# Patient Record
Sex: FEMALE | Race: WHITE | NOT HISPANIC OR LATINO | URBAN - METROPOLITAN AREA
[De-identification: names, ages, dates, MRNs, and addresses within clinical notes are randomized per-mention and may not be internally consistent; named-entity substitution may affect disease eponyms.]

---

## 2019-10-08 ENCOUNTER — INPATIENT HOSPITAL (AMBULATORY)
Dept: URBAN - METROPOLITAN AREA HOSPITAL 133 | Facility: HOSPITAL | Age: 29
End: 2019-10-08

## 2019-10-08 DIAGNOSIS — K81.1 CHRONIC CHOLECYSTITIS: ICD-10-CM

## 2019-10-08 PROCEDURE — 99238 HOSP IP/OBS DSCHRG MGMT 30/<: CPT

## 2020-01-01 ENCOUNTER — HOSPITAL ENCOUNTER (EMERGENCY)
Facility: HOSPITAL | Age: 30
Discharge: HOME | End: 2020-01-01
Attending: EMERGENCY MEDICINE | Admitting: EMERGENCY MEDICINE

## 2020-01-01 VITALS
TEMPERATURE: 97.6 F | DIASTOLIC BLOOD PRESSURE: 84 MMHG | HEIGHT: 64 IN | WEIGHT: 185 LBS | SYSTOLIC BLOOD PRESSURE: 125 MMHG | HEART RATE: 78 BPM | OXYGEN SATURATION: 99 % | RESPIRATION RATE: 16 BRPM | BODY MASS INDEX: 31.58 KG/M2

## 2020-01-01 DIAGNOSIS — K29.20 ACUTE ALCOHOLIC GASTRITIS WITHOUT HEMORRHAGE: Primary | ICD-10-CM

## 2020-01-01 DIAGNOSIS — F10.229: ICD-10-CM

## 2020-01-01 DIAGNOSIS — R11.2 NON-INTRACTABLE VOMITING WITH NAUSEA, UNSPECIFIED VOMITING TYPE: ICD-10-CM

## 2020-01-01 LAB
ANION GAP SERPL CALCULATED.3IONS-SCNC: 15 MMOL/L (ref 5–15)
BUN BLD-MCNC: 8 MG/DL (ref 6–20)
BUN/CREAT SERPL: 11 (ref 7–25)
CALCIUM SPEC-SCNC: 9.1 MG/DL (ref 8.6–10.5)
CHLORIDE SERPL-SCNC: 103 MMOL/L (ref 98–107)
CO2 SERPL-SCNC: 25 MMOL/L (ref 22–29)
CREAT BLD-MCNC: 0.73 MG/DL (ref 0.57–1)
ETHANOL UR QL: 0.13 %
GFR SERPL CREATININE-BSD FRML MDRD: 96 ML/MIN/1.73
GLUCOSE BLD-MCNC: 98 MG/DL (ref 65–99)
POTASSIUM BLD-SCNC: 4.5 MMOL/L (ref 3.5–5.2)
SODIUM BLD-SCNC: 143 MMOL/L (ref 136–145)

## 2020-01-01 PROCEDURE — 96374 THER/PROPH/DIAG INJ IV PUSH: CPT

## 2020-01-01 PROCEDURE — 80307 DRUG TEST PRSMV CHEM ANLYZR: CPT | Performed by: EMERGENCY MEDICINE

## 2020-01-01 PROCEDURE — 99284 EMERGENCY DEPT VISIT MOD MDM: CPT

## 2020-01-01 PROCEDURE — 25010000002 ONDANSETRON PER 1 MG: Performed by: EMERGENCY MEDICINE

## 2020-01-01 PROCEDURE — 80048 BASIC METABOLIC PNL TOTAL CA: CPT | Performed by: EMERGENCY MEDICINE

## 2020-01-01 RX ORDER — ONDANSETRON 2 MG/ML
8 INJECTION INTRAMUSCULAR; INTRAVENOUS ONCE
Status: COMPLETED | OUTPATIENT
Start: 2020-01-01 | End: 2020-01-01

## 2020-01-01 RX ORDER — SODIUM CHLORIDE 0.9 % (FLUSH) 0.9 %
10 SYRINGE (ML) INJECTION AS NEEDED
Status: DISCONTINUED | OUTPATIENT
Start: 2020-01-01 | End: 2020-01-01 | Stop reason: HOSPADM

## 2020-01-01 RX ORDER — ONDANSETRON 4 MG/1
4 TABLET, ORALLY DISINTEGRATING ORAL 4 TIMES DAILY PRN
Qty: 5 TABLET | Refills: 0 | Status: SHIPPED | OUTPATIENT
Start: 2020-01-01 | End: 2022-10-12

## 2020-01-01 RX ADMIN — ONDANSETRON 8 MG: 2 INJECTION INTRAMUSCULAR; INTRAVENOUS at 03:29

## 2020-01-01 RX ADMIN — SODIUM CHLORIDE, POTASSIUM CHLORIDE, SODIUM LACTATE AND CALCIUM CHLORIDE 500 ML: 600; 310; 30; 20 INJECTION, SOLUTION INTRAVENOUS at 03:29

## 2020-01-01 NOTE — DISCHARGE INSTRUCTIONS
Rest today and drink plenty of fluids.  Zofran for nausea if needed.  Follow-up with  if you have persistent symptoms.

## 2020-01-01 NOTE — ED PROVIDER NOTES
Discharge Summary/Instructions after an Endoscopic Procedure  Patient Name: Mitzy Garcia  Patient MRN: [id number]  Patient YOB: 1940  Wednesday, December 19, 2018  Vidhya Mustafa MD  RESTRICTIONS:  During your procedure today, you received medications for sedation.  These   medications may affect your judgment, balance and coordination.  Therefore,   for 24 hours, you have the following restrictions:   - DO NOT drive a car, operate machinery, make legal/financial decisions,   sign important papers or drink alcohol.    ACTIVITY:  Today: no heavy lifting, straining or running due to procedural   sedation/anesthesia.  The following day: return to full activity including work.  DIET:  Eat and drink normally unless instructed otherwise.     TREATMENT FOR COMMON SIDE EFFECTS:  - Mild abdominal pain, nausea, belching, bloating or excessive gas:  rest,   eat lightly and use a heating pad.  - Sore Throat: treat with throat lozenges and/or gargle with warm salt   water.  - Because air was used during the procedure, expelling large amounts of air   from your rectum or belching is normal.  - If a bowel prep was taken, you may not have a bowel movement for 1-3 days.    This is normal.  SYMPTOMS TO WATCH FOR AND REPORT TO YOUR PHYSICIAN:  1. Abdominal pain or bloating, other than gas cramps.  2. Chest pain.  3. Back pain.  4. Signs of infection such as: chills or fever occurring within 24 hours   after the procedure.  5. Rectal bleeding, which would show as bright red, maroon, or black stools.   (A tablespoon of blood from the rectum is not serious, especially if   hemorrhoids are present.)  6. Vomiting.  7. Weakness or dizziness.  GO DIRECTLY TO THE NEAREST EMERGENCY ROOM IF YOU HAVE ANY OF THE FOLLOWING:      Difficulty breathing              Chills and/or fever over 101 F   Persistent vomiting and/or vomiting blood   Severe abdominal pain   Severe chest pain   Black, tarry stools   Bleeding- more than  Subjective   History of Present Illness  29-year-old female was drinking heavily this evening on [place] and came intoxicated and began to vomit repeatedly.  [place] and EMS was called and she was brought to the emergency department.  The patient denies any other issues at this time other than feeling nauseated and intoxicated.  Review of Systems    No past medical history on file.    Allergies   Allergen Reactions   • Penicillins Unknown - Low Severity     Childhood        No past surgical history on file.    No family history on file.    Social History     Socioeconomic History   • Marital status:      Spouse name: Not on file   • Number of children: Not on file   • Years of education: Not on file   • Highest education level: Not on file           Objective   Physical Exam  Patient is awake but intoxicated and lethargic.  Vital signs are stable.  The HEENT exam shows no scleral icterus there is no exudate noted in throat chest is clear cardiovascular exam reveals a regular rhythm her abdomen was soft patient has no cyanosis clubbing or edema no focal neurologic deficit was noted she does have horizontal nystagmus  Procedures           ED Course           Results for orders placed or performed during the hospital encounter of 01/01/20   Ethanol   Result Value Ref Range    Ethanol % 0.134 %   Basic Metabolic Panel   Result Value Ref Range    Glucose 98 65 - 99 mg/dL    BUN 8 6 - 20 mg/dL    Creatinine 0.73 0.57 - 1.00 mg/dL    Sodium 143 136 - 145 mmol/L    Potassium 4.5 3.5 - 5.2 mmol/L    Chloride 103 98 - 107 mmol/L    CO2 25.0 22.0 - 29.0 mmol/L    Calcium 9.1 8.6 - 10.5 mg/dL    eGFR Non African Amer 96 >60 mL/min/1.73    BUN/Creatinine Ratio 11.0 7.0 - 25.0    Anion Gap 15.0 5.0 - 15.0 mmol/L     Medications   sodium chloride 0.9 % flush 10 mL (has no administration in time range)   lactated ringers bolus 500 mL (0 mL Intravenous Stopped 1/1/20 0616)   ondansetron  (ZOFRAN) injection 8 mg (8 mg Intravenous Given 1/1/20 0179)     No radiology results for the last day                                      MDM  Patient was feeling much better after fluids and anti-medics.  The patient will be allowed to go home and is given Zofran.  She is to drink plenty of fluids today and avoid any alcohol.  Final diagnoses:   Acute alcoholic gastritis without hemorrhage   Non-intractable vomiting with nausea, unspecified vomiting type   Acute alcoholic intoxication in alcoholism with blood level of 0.08 to 0.29 with complication (CMS/[facility])            Eulogio Nicolas MD  01/01/20 1153     one tablespoon   Any other symptom or condition that you feel may need urgent attention  Your doctor recommends these additional instructions:  If any biopsies were taken, your doctors clinic will contact you in 1 to 2   weeks with any results.  - Return patient to hospital [facility] for ongoing care.   - Resume previous diet.   - Continue present medications.   - Await cytology results.   - Proceed with ERCP.   For questions, problems or results please call your physician - Vidhya Mustafa MD at Work:  [phone].  [facility], EMERGENCY ROOM PHONE NUMBER: [phone]  IF A COMPLICATION OR EMERGENCY SITUATION ARISES AND YOU ARE UNABLE TO REACH   YOUR PHYSICIAN - GO DIRECTLY TO THE EMERGENCY ROOM.  Vidhya Mustafa MD  12/19/2018 3:38:54 PM  This report has been verified and signed electronically.  [employer]

## 2022-10-06 ENCOUNTER — OFFICE VISIT (OUTPATIENT)
Dept: UROLOGY | Facility: CLINIC | Age: 32
End: 2022-10-06

## 2022-10-06 VITALS
DIASTOLIC BLOOD PRESSURE: 91 MMHG | HEIGHT: 64 IN | BODY MASS INDEX: 31.58 KG/M2 | SYSTOLIC BLOOD PRESSURE: 137 MMHG | TEMPERATURE: 98.6 F | WEIGHT: 185 LBS | HEART RATE: 93 BPM

## 2022-10-06 DIAGNOSIS — N30.00 ACUTE CYSTITIS WITHOUT HEMATURIA: ICD-10-CM

## 2022-10-06 DIAGNOSIS — N20.0 KIDNEY STONES: Primary | ICD-10-CM

## 2022-10-06 LAB
BACTERIA UR QL AUTO: ABNORMAL /HPF
BILIRUB BLD-MCNC: NEGATIVE MG/DL
CLARITY, POC: CLEAR
COLOR UR: YELLOW
EPI CELLS #/AREA URNS HPF: 0 /[HPF]
GLUCOSE UR STRIP-MCNC: NEGATIVE MG/DL
KETONES UR QL: NEGATIVE
LEUKOCYTE EST, POC: NEGATIVE
NITRITE UR-MCNC: NEGATIVE MG/ML
PH UR: 7 [PH] (ref 5–8)
PROT UR STRIP-MCNC: NEGATIVE MG/DL
RBC # UR STRIP: ABNORMAL /HPF
RBC # UR STRIP: NEGATIVE /UL
RENAL EPITHELIAL, POC: 0
SP GR UR: 1.03 (ref 1–1.03)
UNIDENT CRYS URNS QL MICRO: ABNORMAL /HPF
UROBILINOGEN UR QL: NORMAL
WBC # UR STRIP: ABNORMAL /HPF

## 2022-10-06 PROCEDURE — 87086 URINE CULTURE/COLONY COUNT: CPT | Performed by: UROLOGY

## 2022-10-06 PROCEDURE — 99203 OFFICE O/P NEW LOW 30 MIN: CPT | Performed by: UROLOGY

## 2022-10-06 RX ORDER — SULFAMETHOXAZOLE AND TRIMETHOPRIM 800; 160 MG/1; MG/1
TABLET ORAL
COMMUNITY
Start: 2022-10-04

## 2022-10-06 RX ORDER — HYDROCODONE BITARTRATE AND ACETAMINOPHEN 5; 325 MG/1; MG/1
1 TABLET ORAL EVERY 4 HOURS PRN
COMMUNITY
Start: 2022-09-30 | End: 2022-10-12

## 2022-10-06 RX ORDER — IBUPROFEN 800 MG/1
800 TABLET ORAL 3 TIMES DAILY PRN
COMMUNITY
Start: 2022-09-30

## 2022-10-06 RX ORDER — FLUTICASONE PROPIONATE 50 MCG
1 SPRAY, SUSPENSION (ML) NASAL DAILY
COMMUNITY

## 2022-10-06 RX ORDER — LORATADINE 10 MG/1
10 TABLET ORAL DAILY
COMMUNITY

## 2022-10-06 RX ORDER — PHENAZOPYRIDINE HYDROCHLORIDE 200 MG/1
TABLET, FILM COATED ORAL
COMMUNITY
Start: 2022-09-30 | End: 2022-10-12

## 2022-10-06 RX ORDER — PROMETHAZINE HYDROCHLORIDE 25 MG/1
TABLET ORAL
COMMUNITY
Start: 2022-07-08

## 2022-10-06 RX ORDER — LEVOTHYROXINE SODIUM 0.03 MG/1
25 TABLET ORAL DAILY
COMMUNITY

## 2022-10-06 RX ORDER — TAMSULOSIN HYDROCHLORIDE 0.4 MG/1
1 CAPSULE ORAL EVERY MORNING
COMMUNITY
Start: 2022-09-30

## 2022-10-06 NOTE — PROGRESS NOTES
"Chief Complaint  Nephrolithiasis    Left ureteral calculus    UTI    Subjective  No acute distress today        Kaela Oreilly presents to [facility] UROLOGY  History of Present Illness    Patient has started having pain in her urethra about 9 days ago.  Pain was unbearable to start with but since yesterday pain has become very reduced in amount.  Patient never had a kidney stone before.  Patient have history of kidney stone in her grandmother.  There is no gross hematuria.  She did have nausea vomiting but that is relieved now.  Starting yesterday patient is doing much better.  There is no fever or chills.    She had an IVP done in [facility] and found to have a 5 mm stone in the middle pole right kidney and a 2 mm stone near the left UV junction.    Objective No acute distress at this time  Vital Signs:   /91   Pulse 93   Temp 98.6 °F (37 °C)   Ht 162.6 cm (64\")   Wt 83.9 kg (185 lb)   BMI 31.76 kg/m²     Allergies   Allergen Reactions   • Penicillins Unknown - Low Severity     Childhood       Past medical history:  has a past medical history of Anxiety, Kidney stone, Seasonal allergies, and Thyroid condition.   Past surgical history:  has a past surgical history that includes ERCP w/ plastic stent placement; Tonsillectomy and adenoidectomy; and Colonoscopy.  She also had cholecystectomy  Personal history: family history includes Diabetes in her mother.  Social history:  reports that she quit smoking about 2 years ago. Her smoking use included cigarettes. She has never used smokeless tobacco. She reports previous alcohol use. Drug use questions deferred to the physician.  Patient is a lesbian    Review of Systems    Please see past medical and surgical history rest of the systems is negative    Physical Exam  Constitutional:       General: She is not in acute distress.     Appearance: Normal appearance. She is obese. She is not ill-appearing or toxic-appearing.   HENT:      Head: " Normocephalic and atraumatic.      Ears:      Comments: No hearing loss  Cardiovascular:      Rate and Rhythm: Normal rate and regular rhythm.      Pulses: Normal pulses.      Heart sounds: Normal heart sounds. No murmur heard.  Pulmonary:      Effort: Pulmonary effort is normal.      Breath sounds: Normal breath sounds. No rhonchi or rales.   Abdominal:      Palpations: Abdomen is soft. There is no mass.      Tenderness: There is abdominal tenderness. There is no right CVA tenderness or left CVA tenderness.      Comments: Tenderness right lower quadrant   Musculoskeletal:         General: Normal range of motion.      Cervical back: Normal range of motion and neck supple. No rigidity or tenderness.   Lymphadenopathy:      Cervical: No cervical adenopathy.   Skin:     General: Skin is warm.      Coloration: Skin is not jaundiced.   Neurological:      General: No focal deficit present.      Mental Status: She is alert and oriented to person, place, and time.      Gait: Gait normal.   Psychiatric:         Behavior: Behavior normal.         Thought Content: Thought content normal.         Judgment: Judgment normal.        Result Review :                  I looked at the CT scan done at [facility] which reveals 5 mm stone in the lower pole right kidney and a 2 mm stone in the lower left ureter.  Assessment and Plan    Diagnoses and all orders for this visit:    1. Kidney stones (Primary)  -     POC Urinalysis Dipstick  -     POC Urine Microscopic Only  -     Urine Culture - Urine, Urine, Clean Catch    2. Acute cystitis without hematuria  -     POC Urine Microscopic Only  -     Urine Culture - Urine, Urine, Clean Catch      Patient is markedly improved since yesterday so I think she might have passed a stone from the left ureter.  I have asked her to start taking lot of fluids and walk so she should be able to pass the stone.  She was given Macrobid from the emergency room but later on they called her to start  taking Bactrim DS which she has not started and I will ask her to start taking Bactrim DS and I will recheck her in about 6 days time.  She does have 1 bacteria in the urine and 1+ cell and because of the history of left ureteral calculus she needs to be covered by the antibiotics.  Dominguez is not here today so we will do a KUB next week  Brief Urine Lab Results  (Last result in the past 365 days)      Color   Clarity   Blood   Leuk Est   Nitrite   Protein   CREAT   Urine HCG        10/06/22 1502 Yellow   Clear   Negative   Negative   Negative   Negative                  Follow Up   Return in about 1 week (around 10/13/2022).  Patient was given instructions and counseling regarding her condition or for health maintenance advice. Please see specific information pulled into the AVS if appropriate.     Marilee Ray MD

## 2022-10-07 LAB — BACTERIA SPEC AEROBE CULT: NO GROWTH

## 2022-10-12 ENCOUNTER — OFFICE VISIT (OUTPATIENT)
Dept: UROLOGY | Facility: CLINIC | Age: 32
End: 2022-10-12

## 2022-10-12 VITALS
BODY MASS INDEX: 35 KG/M2 | DIASTOLIC BLOOD PRESSURE: 91 MMHG | TEMPERATURE: 97.7 F | WEIGHT: 205 LBS | SYSTOLIC BLOOD PRESSURE: 127 MMHG | HEART RATE: 81 BPM | HEIGHT: 64 IN

## 2022-10-12 DIAGNOSIS — N20.0 KIDNEY STONE ON RIGHT SIDE: Primary | ICD-10-CM

## 2022-10-12 DIAGNOSIS — N20.1 LEFT URETERAL STONE: ICD-10-CM

## 2022-10-12 LAB
BILIRUB BLD-MCNC: NEGATIVE MG/DL
CLARITY, POC: CLEAR
COLOR UR: YELLOW
EXPIRATION DATE: ABNORMAL
GLUCOSE UR STRIP-MCNC: NEGATIVE MG/DL
KETONES UR QL: NEGATIVE
LEUKOCYTE EST, POC: ABNORMAL
Lab: ABNORMAL
NITRITE UR-MCNC: NEGATIVE MG/ML
PH UR: 5 [PH] (ref 5–8)
PROT UR STRIP-MCNC: NEGATIVE MG/DL
RBC # UR STRIP: NEGATIVE /UL
SP GR UR: 1.03 (ref 1–1.03)
UROBILINOGEN UR QL: ABNORMAL

## 2022-10-12 PROCEDURE — 99212 OFFICE O/P EST SF 10 MIN: CPT | Performed by: UROLOGY

## 2022-10-12 NOTE — PROGRESS NOTES
"Chief Complaint  Follow-up for 2 mm stone in the left lower ureter    Subjective  Patient is doing fine        Kaela Day presents to [facility] UROLOGY  History of Present Illness    Patient passed the stone about 5 days ago.  She had a lot of pain the day before and the last 5 days she has no pain or problems.  She is urinating fine and has no dysuria.  No gross hematuria.  Patient has no frequency .    Objective No acute distress  Vital Signs:   /91   Pulse 81   Temp 97.7 °F (36.5 °C)   Ht 162.6 cm (64\")   Wt 93 kg (205 lb)   BMI 35.19 kg/m²     Allergies   Allergen Reactions   • Penicillins Unknown - Low Severity     Childhood       Past medical history:  has a past medical history of Anxiety, Kidney stone, Seasonal allergies, and Thyroid condition.   Past surgical history:  has a past surgical history that includes ERCP w/ plastic stent placement; Tonsillectomy and adenoidectomy; and Colonoscopy.  Personal history: family history includes Diabetes in her mother.  Social history:  reports that she quit smoking about 2 years ago. Her smoking use included cigarettes. She has never used smokeless tobacco. She reports that she does not currently use alcohol. Drug use questions deferred to the physician.    Review of Systems    Please see past medical and surgical history and rest of the system is negative    Physical Exam  Constitutional:       General: She is not in acute distress.     Appearance: Normal appearance. She is obese. She is not ill-appearing or toxic-appearing.   HENT:      Head: Normocephalic and atraumatic.      Ears:      Comments: No loss of hearing  Pulmonary:      Effort: Pulmonary effort is normal. No respiratory distress.   Abdominal:      Palpations: Abdomen is soft. There is no mass.      Tenderness: There is no abdominal tenderness. There is no right CVA tenderness or left CVA tenderness.      Hernia: No hernia is present.   Skin:     General: Skin is warm.      " Coloration: Skin is not jaundiced.   Neurological:      General: No focal deficit present.      Mental Status: She is alert and oriented to person, place, and time.      Motor: No weakness.      Gait: Gait normal.   Psychiatric:         Mood and Affect: Mood normal.         Behavior: Behavior normal.         Thought Content: Thought content normal.         Judgment: Judgment normal.        Result Review :                 Assessment and Plan    Diagnoses and all orders for this visit:    1. Kidney stone on right side (Primary)  -     POC Urinalysis Dipstick, Automated  -     Cancel: XR Abdomen 1 View    2. Left ureteral stone      Patient apparently has passed the stone from the left side.  We discussed the stone on the right side and I have explained her that it may never bother or may decide to come down but she can probably pass it.  I have given her option to have PTH intact, uric acid and 24-hour urine but she is just going to follow my instructions on how to prevent renal calculi specially drinking about a gallon of water a day.  I agree with her and review recheck her on as needed basis  Brief Urine Lab Results  (Last result in the past 365 days)      Color   Clarity   Blood   Leuk Est   Nitrite   Protein   CREAT   Urine HCG        10/12/22 1047 Yellow   Clear   Negative   Large (3+)   Negative   Negative                  Follow Up   No follow-ups on file.  Patient was given instructions and counseling regarding her condition or for health maintenance advice. Please see specific information pulled into the AVS if appropriate.     Marilee Ray MD